# Patient Record
Sex: FEMALE | Race: BLACK OR AFRICAN AMERICAN | Employment: FULL TIME | URBAN - METROPOLITAN AREA
[De-identification: names, ages, dates, MRNs, and addresses within clinical notes are randomized per-mention and may not be internally consistent; named-entity substitution may affect disease eponyms.]

---

## 2017-05-18 ENCOUNTER — HOSPITAL ENCOUNTER (OUTPATIENT)
Dept: MAMMOGRAPHY | Age: 47
Discharge: HOME | End: 2017-05-18
Attending: FAMILY MEDICINE
Payer: COMMERCIAL

## 2017-05-18 DIAGNOSIS — Z12.31 ENCOUNTER FOR MAMMOGRAM TO ESTABLISH BASELINE MAMMOGRAM: ICD-10-CM

## 2017-05-18 PROCEDURE — 77067 SCR MAMMO BI INCL CAD: CPT

## 2018-04-27 ENCOUNTER — OFFICE VISIT (OUTPATIENT)
Dept: NEUROLOGY | Age: 48
End: 2018-04-27

## 2018-04-27 ENCOUNTER — TELEPHONE (OUTPATIENT)
Dept: NEUROLOGY | Age: 48
End: 2018-04-27

## 2018-04-27 VITALS
RESPIRATION RATE: 16 BRPM | DIASTOLIC BLOOD PRESSURE: 70 MMHG | SYSTOLIC BLOOD PRESSURE: 128 MMHG | WEIGHT: 193 LBS | OXYGEN SATURATION: 99 % | HEART RATE: 75 BPM

## 2018-04-27 DIAGNOSIS — R43.1 ABNORMAL SMELL: Primary | ICD-10-CM

## 2018-04-27 DIAGNOSIS — G44.221 CHRONIC TENSION-TYPE HEADACHE, INTRACTABLE: ICD-10-CM

## 2018-04-27 DIAGNOSIS — M54.81 BILATERAL OCCIPITAL NEURALGIA: ICD-10-CM

## 2018-04-27 DIAGNOSIS — R20.2 PARESTHESIA: ICD-10-CM

## 2018-04-27 RX ORDER — OMEPRAZOLE 40 MG/1
40 CAPSULE, DELAYED RELEASE ORAL DAILY
COMMUNITY

## 2018-04-27 RX ORDER — HYDROCHLOROTHIAZIDE 12.5 MG/1
12.5 TABLET ORAL DAILY
COMMUNITY

## 2018-04-27 RX ORDER — LEVONORGESTREL AND ETHINYL ESTRADIOL 6-5-10
KIT ORAL
COMMUNITY

## 2018-04-27 RX ORDER — LISINOPRIL 40 MG/1
40 TABLET ORAL DAILY
COMMUNITY

## 2018-04-27 NOTE — PROGRESS NOTES
NEUROLOGY NEW PATIENT CONSULTATION    REFERRED BY:  Christo Carrasco MD    CHIEF COMPLAINT:  Migraines    HISTORY OF PRESENT ILLNESS    HISTORY PROVIDED BY:  Patient      Comfort Enrique is a 52 y.o. female who I am asked to see in consultation for migraines. Patient reports a history of migraines as a teenager. She has been having issues for about 12 years. She has had issues with headaches and smelling an odor. She has had normal CT sinuses checked. She has had an MRI brain and MRA head/neck that were fine. Nov 2017. She also had an EEG that was fine. She has been experiencing headaches with abnormal sensations on the left side more recently. The paresthesias are on the left side of her scalp and sometimes in her face. She gets headaches in the temples and forehead. She also started with them in the back of her head. She is getting these headaches every day. No nausea, no photo/phonophobia. She will get occasional dizziness. She feels like her headaches are about the same as her migraines. She cannot differentiate them. She does not have any nausea or photo/phonophobia with her migraines either. She has a left eye twitch that is also recent. She does drink a lot coke. She has issues with an abnormal smell. It is not all day long. She will have a rotten egg smell. It will be a few minutes to all day. No seizures that she is aware. No epilepsy risk factors. She says that at some point she was put on a medication but doesn't recall what it was- maybe topamax. She did not like having a feels that she stopped taking it. She did not follow-up with his neurologist after that. FH of sister and mom with headaches. No seizures. Patient denies any new focal numbness or weakness. She has had MRI of the brain while having the above symptoms. She is under significant stress given that her  was recently diagnosed with stage IV kidney cancer with metastases to the brain.   He just had gamma knife with Dr. yCrus Martinez. PMH  Hypertension, migraines    SH  Social History     Social History    Marital status:      Spouse name: N/A    Number of children: N/A    Years of education: N/A     Social History Main Topics    Smoking status: Not on file    Smokeless tobacco: Not on file    Alcohol use Not on file    Drug use: Not on file    Sexual activity: Not on file     Other Topics Concern    Not on file     Social History Narrative    No narrative on file       FH  Headaches in sister and mom    ALLERGIES  Allergies no known allergies    CURRENT MEDS    Current Outpatient Prescriptions:     lisinopril (PRINIVIL, ZESTRIL) 40 mg tablet, Take 40 mg by mouth daily. , Disp: , Rfl:     hydroCHLOROthiazide (HYDRODIURIL) 12.5 mg tablet, Take 12.5 mg by mouth daily. , Disp: , Rfl:     levonorgestrel-ethinyl estradiol (ENPRESSE) 50-30 (6)/75-40 (5)/125-30(10) tab, Take  by mouth., Disp: , Rfl:     omeprazole (PRILOSEC) 40 mg capsule, Take 40 mg by mouth daily. , Disp: , Rfl:   Flonase and Dymsta    REVIEW OF SYSTEMS:     Y  N       Y  N  Y  N   Y  N  [] [x] AIDS          [] [x] Falls  [] [x] Memory Loss  [] [x]  Shortness of breath  [] [x] Anxiety          [] [x] Fatigue [] [x] Muscle Pain        [] [x]  Skipped beats  [] [x] Chest Pain   [x] [] Frequent HA [] [x] Ms Weakness     [] [x]  Snoring  [] [x] Constipation [] [x]Hearing loss [] [x] Nause/Vomiting  [] [x]  Stomach Pain  [] [x] Cough          [] [x]Hepatitis [] [x] Neuropathy         [] [x]  Swallowing difficulty  [] [x] Depression  [] [x]Incontinence [] [x] Poor appetite      [] [x]  Vertigo  [] [x] Diarrhea       [] [x] Joint Pain [] [x] Rash                   [] [x]  Visual disturbances  [] [x] Fainting        [] [x] Leg Swelling [] [x] Ringing ears       [] [x]  Weight changes      []Unable to obtain  ROS due to  []mental status change  []sedated   []intubated          PREVIOUS WORKUP  IMAGING: MRI of the brain normal per patient.   Will get records    LABS  No results found for this or any previous visit. PHYSICAL EXAM  Visit Vitals    Wt 87.5 kg (193 lb)     General:  Alert, cooperative, no distress. Head:  Normocephalic, without obvious abnormality, atraumatic. Eyes:  Conjunctivae/corneas clear. Pupils equal, round, reactive to light. Extraocular movements intact, VFF, NO papilledema   Lungs:  Heart:   Non labored breathing  Regular rate and rhythm, no carotid bruits   Abdomen:   Soft, non-distended   Extremities: Extremities normal, atraumatic, no cyanosis or edema. Pulses: 2+ and symmetric all extremities. Skin: Skin color, texture, turgor normal. No rashes or lesions. Neurologic:  Gen: Attention normal             Language: naming, repetition, fluency normal             Memory: intact recent and remote memory  Cranial Nerves:  I: smell Not tested   II: visual fields Full to confrontation   II: pupils Equal, round, reactive to light   II: optic disc No papilledema   III,VII: ptosis none   III,IV,VI: extraocular muscles  Full ROM   V: mastication normal   V: facial light touch sensation  normal   VII: facial muscle function   symmetric   VIII: hearing symmetric   IX: soft palate elevation  normal   XI: trapezius strength  5/5   XI: sternocleidomastoid strength 5/5   XI: neck flexion strength  5/5   XII: tongue  midline     Motor: normal bulk and tone, no tremor              Strength: 5/5 all four extremities  Sensory: intact to LT, PP, vibration, and temperature  Coordination: FTN intact, Rhomberg negative  Gait: normal gait including tandem   Reflexes: 2+ throughout       IMPRESSION  Jonathan Mark is a 52 y.o. female who presents for evaluation of headaches, migraines, paresthesias on the left side of the head and face, and abnormal smells. Patient reports a prior MRI that was negative. Will get this report. She is also had an EEG. She did not have any events during the EEG.   Will do a sleep deprived EEG to see if we can capture an episode. Additionally I think patient is having form a tension headache rather than a migraine headache. Will try to do a preventative medication for this to see if it helps with her symptoms. RECOMMENDATIONS    Problem List Items Addressed This Visit     None      Visit Diagnoses     Abnormal smell    -  Primary    Relevant Medications    lamoTRIgine (LAMICTAL XR STARTER, ORANGE,) 25mg (14)-50 mg (14)-100mg (7) TERD    Other Relevant Orders    CBC WITH AUTOMATED DIFF    METABOLIC PANEL, COMPREHENSIVE    EEG AMB NEURO    Chronic tension-type headache, intractable        Relevant Medications    lamoTRIgine (LAMICTAL XR STARTER, ORANGE,) 25mg (14)-50 mg (14)-100mg (7) TERD    Other Relevant Orders    CBC WITH AUTOMATED DIFF    METABOLIC PANEL, COMPREHENSIVE    EEG AMB NEURO    Bilateral occipital neuralgia        Relevant Medications    lamoTRIgine (LAMICTAL XR STARTER, ORANGE,) 25mg (14)-50 mg (14)-100mg (7) TERD    Other Relevant Orders    CBC WITH AUTOMATED DIFF    METABOLIC PANEL, COMPREHENSIVE    EEG AMB NEURO    Paresthesia        Relevant Medications    lamoTRIgine (LAMICTAL XR STARTER, ORANGE,) 25mg (14)-50 mg (14)-100mg (7) TERD    Other Relevant Orders    CBC WITH AUTOMATED DIFF    METABOLIC PANEL, COMPREHENSIVE    EEG AMB NEURO      Will get records of prior MRI. Migraine book also given    FU 10-12 weeks    Arturo Gould MD    CC: Jono Clemens MD  Fax: [phone]    Medications and side effects discussed with patient in detail. With any new medications prescribed, patient was given instructions on administration and side effects. Written medication information was provided to the patient as well. This note was created using voice recognition software. Despite editing, there may be syntax errors. This note will not be viewable in [address]

## 2018-04-27 NOTE — TELEPHONE ENCOUNTER
Called and spoke with pharmacist to clarify lamictal XR prescription.     Per Dr. Gus Nogueira  Lamictal XR     25 mg tabs quantity 14 tablets take 1 tab by mouth for 2 weeks    50mg tabs quantity 14 tabs take 1 tab by mouth for 2 weeks    100 mg tabs quantity 30 tabs take 1 tab by mouth     P

## 2018-04-27 NOTE — PATIENT INSTRUCTIONS
[facility]   Statement to Patients  April 1, 2014      In an effort to ensure the large volume of patient prescription refills is processed in the most efficient and expeditious manner, we are asking our patients to assist us by calling your Pharmacy for all prescription refills, this will include also your  Mail Order Pharmacy. The pharmacy will contact our office electronically to continue the refill process. Please do not wait until the last minute to call your pharmacy. We need at least 48 hours (2days) to fill prescriptions. We also encourage you to call your pharmacy before going to  your prescription to make sure it is ready. With regard to controlled substance prescription refill requests (narcotic refills) that need to be picked up at our office, we ask your cooperation by providing us with at least 72 hours (3days) notice that you will need a refill. We will not refill narcotic prescription refill requests after 4:00pm on any weekday, Monday through Thursday, or after 2:00pm on Fridays, or on the weekends. We encourage everyone to explore another way of getting your prescription refill request processed using [employer], our patient web portal through our electronic medical record system. [employer] is an efficient and effective way to communicate your medication request directly to the office and  downloadable as an katerin on your smart phone . [employer] also features a review functionality that allows you to view your medication list as well as leave messages for your physician. Are you ready to get connected? If so please review the attatched instructions or speak to any of our staff to get you set up right away! Thank you so much for your cooperation. Should you have any questions please contact our Practice Administrator.     The Physicians and Staff,  [facility] [address]  What is a living will?    A living will is a legal form you use to write down the kind of care you want at the end of your life. It is used by the health professionals who will treat you if you aren't able to decide for yourself. If you put your wishes in writing, your loved ones and others will know what kind of care you want. They won't need to guess. This can ease your mind and be helpful to others. A living will is not the same as an estate or property will. An estate will explains what you want to happen with your money and property after you die. Is a living will a legal document? A living will is a legal document. Each state has its own laws about living corley. If you move to another state, make sure that your living will is legal in the state where you now live. Or you might use a universal form that has been approved by many states. This kind of form can sometimes be completed and stored online. Your electronic copy will then be available wherever you have a connection to the Internet. In most cases, doctors will respect your wishes even if you have a form from a different state. · You don't need an  to complete a living will. But legal advice can be helpful if your state's laws are unclear, your health history is complicated, or your family can't agree on what should be in your living will. · You can change your living will at any time. Some people find that their wishes about end-of-life care change as their health changes. · In addition to making a living will, think about completing a medical power of  form. This form lets you name the person you want to make end-of-life treatment decisions for you (your \"health care agent\") if you're not able to. Many hospitals and nursing homes will give you the forms you need to complete a living will and a medical power of . · Your living will is used only if you can't make or communicate decisions for yourself anymore.  If you become able to make decisions again, you can accept or refuse any treatment, no matter what you wrote in your living will. · Your state may offer an online registry. This is a place where you can store your living will online so the doctors and nurses who need to treat you can find it right away. What should you think about when creating a living will? Talk about your end-of-life wishes with your family members and your doctor. Let them know what you want. That way the people making decisions for you won't be surprised by your choices. Think about these questions as you make your living will:  · Do you know enough about life support methods that might be used? If not, talk to your doctor so you know what might be done if you can't breathe on your own, your heart stops, or you're unable to swallow. · What things would you still want to be able to do after you receive life-support methods? Would you want to be able to walk? To speak? To eat on your own? To live without the help of machines? · If you have a choice, where do you want to be cared for? In your home? At a hospital or nursing home? · Do you want certain Presybeterian practices performed if you become very ill? · If you have a choice at the end of your life, where would you prefer to die? At home? In a hospital or nursing home? Somewhere else? · Would you prefer to be buried or cremated? · Do you want your organs to be donated after you die? What should you do with your living will? · Make sure that your family members and your health care agent have copies of your living will. · Give your doctor a copy of your living will to keep in your medical record. If you have more than one doctor, make sure that each one has a copy. · You may want to put a copy of your living will where it can be easily found. Where can you learn more? Go to http://emiliano-jovan.info/. Enter H869 in the search box to learn more about \"Learning About Living Perjack. \"  Current as of: September 24, 2016  Content Version: 11.4  © 8428-4556 [employer]. Care instructions adapted under license by [employer] (which disclaims liability or warranty for this information). If you have questions about a medical condition or this instruction, always ask your healthcare professional. [address] any warranty or liability for your use of this information. Advance Directives: Care Instructions  Your Care Instructions  An advance directive is a legal way to state your wishes at the end of your life. It tells your family and your doctor what to do if you can no longer say what you want. There are two main types of advance directives. You can change them any time that your wishes change. · A living will tells your family and your doctor your wishes about life support and other treatment. · A durable power of  for health care lets you name a person to make treatment decisions for you when you can't speak for yourself. This person is called a health care agent. If you do not have an advance directive, decisions about your medical care may be made by a doctor or a  who doesn't know you. It may help to think of an advance directive as a gift to the people who care for you. If you have one, they won't have to make tough decisions by themselves. Follow-up care is a key part of your treatment and safety. Be sure to make and go to all appointments, and call your doctor if you are having problems. It's also a good idea to know your test results and keep a list of the medicines you take. How can you care for yourself at home? · Discuss your wishes with your loved ones and your doctor. This way, there are no surprises. · Many states have a unique form. Or you might use a universal form that has been approved by many states. This kind of form can sometimes be completed and stored online.  Your electronic copy will then be available wherever you have a connection to the Internet. In most cases, doctors will respect your wishes even if you have a form from a different state. · You don't need a  to do an advance directive. But you may want to get legal advice. · Think about these questions when you prepare an advance directive:  ¨ Who do you want to make decisions about your medical care if you are not able to? Many people choose a family member or close friend. ¨ Do you know enough about life support methods that might be used? If not, talk to your doctor so you understand. ¨ What are you most afraid of that might happen? You might be afraid of having pain, losing your independence, or being kept alive by machines. ¨ Where would you prefer to die? Choices include your home, a hospital, or a nursing home. ¨ Would you like to have information about hospice care to support you and your family? ¨ Do you want to donate organs when you die? ¨ Do you want certain Sabianist practices performed before you die? If so, put your wishes in the advance directive. · Read your advance directive every year, and make changes as needed. When should you call for help? Be sure to contact your doctor if you have any questions. Where can you learn more? Go to http://emiliano-jovan.info/. Enter R264 in the search box to learn more about \"Advance Directives: Care Instructions. \"  Current as of: September 24, 2016  Content Version: 11.4  © 9883-9576 [employer]. Care instructions adapted under license by [employer] (which disclaims liability or warranty for this information). If you have questions about a medical condition or this instruction, always ask your healthcare professional. [address] any warranty or liability for your use of this information.

## 2018-04-27 NOTE — TELEPHONE ENCOUNTER
[facility] pharmacy calling requesting clarification for lamictal. Can be reached at [phone]

## 2018-04-27 NOTE — PROGRESS NOTES
Chief Complaint   Patient presents with    Headache     olfactory      1. Have you been to the ER, urgent care clinic since your last visit? Hospitalized since your last visit? No    2. Have you seen or consulted any other health care providers outside of the [address] [place] [address] since your last visit? Include any pap smears or colon screening.  No     Reviewed record in preparation for visit and have necessary documentation  Pt did not bring medication to office visit for review  Medication list reviewed and reconciled with patient  Information was given to pt on Advanced Directives, Living Will  opportunity was given for questions

## 2018-04-27 NOTE — MR AVS SNAPSHOT
[facility] 
 
 
 [place] [address] [address] [phone] Patient: Danny Manuel MRN: [id number]:[id number] Visit Information Date & Time Provider Department Dept. Phone Encounter #  
 4/27/2018  2:00 PM Farooq Orosco MD [facility] Neurology [facility] [phone] [id number] Follow-up Instructions Return in about 10 weeks (around 7/6/2018). Your Appointments 5/14/2018  8:30 AM  
PROCEDURE with EEG [facility]) Appt Note: Sleep deprived EEG  
 [address] [address] [phone]  
  
   
 [place] [id number] [address] 7/13/2018  2:40 PM  
Follow Up with Farooq Orosco MD  
[facility] Appt Note: Test results [place] [address] [address] [phone]  
  
   
 [place] [id number] [address] Upcoming Health Maintenance Date Due DTaP/Tdap/Td series (1 - Tdap) 11/13/1991 PAP AKA CERVICAL CYTOLOGY 11/13/1991 Influenza Age 5 to Adult 8/1/2018 Allergies as of 4/27/2018  Never Reviewed No Known Allergies Current Immunizations  Never Reviewed No immunizations on file. Not reviewed this visit You Were Diagnosed With   
  
 Codes Comments Abnormal smell    -  Primary ICD-10-CM: R43.1 ICD-9-CM: 620. 1 Chronic tension-type headache, intractable     ICD-10-CM: Y75.926 ICD-9-CM: 339.12 Bilateral occipital neuralgia     ICD-10-CM: M54.81 ICD-9-CM: 723.8 Paresthesia     ICD-10-CM: R20.2 ICD-9-CM: 778. 0 Vitals Weight(growth percentile) OB Status 193 lb (87.5 kg) Having regular periods Preferred Pharmacy Pharmacy Name Phone [facility] [address] [place] [address] [phone] Your Updated Medication List  
  
   
This list is accurate as of 4/27/18  3:05 PM.  Always use your most recent med list.  
  
  
  
  
 hydroCHLOROthiazide 12.5 mg tablet Commonly known as:  HYDRODIURIL Take 12.5 mg by mouth daily. lamoTRIgine 25mg (14)-50 mg (14)-100mg (7) Terd Commonly known as: LaMICtal XR Starter (Orange) Take as directed  
  
 levonorgestrel-ethinyl estradiol 50-30 (6)/75-40 (5)/125-30(10) Tab Commonly known as:  ENPRESSE Take  by mouth.  
  
 lisinopril 40 mg tablet Commonly known as:  Velton Cardona Take 40 mg by mouth daily. PriLOSEC 40 mg capsule Generic drug:  omeprazole Take 40 mg by mouth daily. Prescriptions Sent to Pharmacy Refills  
 lamoTRIgine (LAMICTAL XR STARTER, ORANGE,) 25mg (14)-50 mg (14)-100mg (7) TERD 35 Sig: Take as directed Class: Normal  
 Pharmacy: [facility] [address] [place] [address] Ph #: [phone] We Performed the Following CBC WITH AUTOMATED DIFF [16083 CPT(R)] METABOLIC PANEL, COMPREHENSIVE [87734 CPT(R)] Follow-up Instructions Return in about 10 weeks (around 7/6/2018). To-Do List   
 04/28/2018 Neurology:  EEG AMB NEURO Patient Instructions PRESCRIPTION REFILL POLICY [facility] Statement to Patients April 1, 2014 In an effort to ensure the large volume of patient prescription refills is processed in the most efficient and expeditious manner, we are asking our patients to assist us by calling your Pharmacy for all prescription refills, this will include also your  Mail Order Pharmacy. The pharmacy will contact our office electronically to continue the refill process. Please do not wait until the last minute to call your pharmacy. We need at least 48 hours (2days) to fill prescriptions.  We also encourage you to call your pharmacy before going to  your prescription to make sure it is ready. With regard to controlled substance prescription refill requests (narcotic refills) that need to be picked up at our office, we ask your cooperation by providing us with at least 72 hours (3days) notice that you will need a refill. We will not refill narcotic prescription refill requests after 4:00pm on any weekday, Monday through Thursday, or after 2:00pm on Fridays, or on the weekends. We encourage everyone to explore another way of getting your prescription refill request processed using [employer], our patient web portal through our electronic medical record system. [employer] is an efficient and effective way to communicate your medication request directly to the office and  downloadable as an katerin on your smart phone . [employer] also features a review functionality that allows you to view your medication list as well as leave messages for your physician. Are you ready to get connected? If so please review the attatched instructions or speak to any of our staff to get you set up right away! Thank you so much for your cooperation. Should you have any questions please contact our Practice Administrator. The Physicians and Staff,  [facility] [address] What is a living will? A living will is a legal form you use to write down the kind of care you want at the end of your life. It is used by the health professionals who will treat you if you aren't able to decide for yourself. If you put your wishes in writing, your loved ones and others will know what kind of care you want. They won't need to guess. This can ease your mind and be helpful to others. A living will is not the same as an estate or property will. An estate will explains what you want to happen with your money and property after you die. Is a living will a legal document? A living will is a legal document. Each state has its own laws about living corley. If you move to another state, make sure that your living will is legal in the state where you now live. Or you might use a universal form that has been approved by many states. This kind of form can sometimes be completed and stored online. Your electronic copy will then be available wherever you have a connection to the Internet. In most cases, doctors will respect your wishes even if you have a form from a different state. · You don't need an  to complete a living will. But legal advice can be helpful if your state's laws are unclear, your health history is complicated, or your family can't agree on what should be in your living will. · You can change your living will at any time. Some people find that their wishes about end-of-life care change as their health changes. · In addition to making a living will, think about completing a medical power of  form. This form lets you name the person you want to make end-of-life treatment decisions for you (your \"health care agent\") if you're not able to. Many hospitals and nursing homes will give you the forms you need to complete a living will and a medical power of . · Your living will is used only if you can't make or communicate decisions for yourself anymore. If you become able to make decisions again, you can accept or refuse any treatment, no matter what you wrote in your living will. · Your state may offer an online registry. This is a place where you can store your living will online so the doctors and nurses who need to treat you can find it right away. What should you think about when creating a living will? Talk about your end-of-life wishes with your family members and your doctor. Let them know what you want. That way the people making decisions for you won't be surprised by your choices. Think about these questions as you make your living will: · Do you know enough about life support methods that might be used? If not, talk to your doctor so you know what might be done if you can't breathe on your own, your heart stops, or you're unable to swallow. · What things would you still want to be able to do after you receive life-support methods? Would you want to be able to walk? To speak? To eat on your own? To live without the help of machines? · If you have a choice, where do you want to be cared for? In your home? At a hospital or nursing home? · Do you want certain Roman Catholic practices performed if you become very ill? · If you have a choice at the end of your life, where would you prefer to die? At home? In a hospital or nursing home? Somewhere else? · Would you prefer to be buried or cremated? · Do you want your organs to be donated after you die? What should you do with your living will? · Make sure that your family members and your health care agent have copies of your living will. · Give your doctor a copy of your living will to keep in your medical record. If you have more than one doctor, make sure that each one has a copy. · You may want to put a copy of your living will where it can be easily found. Where can you learn more? Go to http://emiliano-jovan.info/. Enter Q430 in the search box to learn more about \"Learning About Living Miah Stubbs. \" Current as of: September 24, 2016 Content Version: 11.4 © 5071-1216 InStaff. Care instructions adapted under license by [employer] (which disclaims liability or warranty for this information). If you have questions about a medical condition or this instruction, always ask your healthcare professional. [place] [address] any warranty or liability for your use of this information. Advance Directives: Care Instructions Your Care Instructions An advance directive is a legal way to state your wishes at the end of your life. It tells your family and your doctor what to do if you can no longer say what you want. There are two main types of advance directives. You can change them any time that your wishes change. · A living will tells your family and your doctor your wishes about life support and other treatment. · A durable power of  for health care lets you name a person to make treatment decisions for you when you can't speak for yourself. This person is called a health care agent. If you do not have an advance directive, decisions about your medical care may be made by a doctor or a  who doesn't know you. It may help to think of an advance directive as a gift to the people who care for you. If you have one, they won't have to make tough decisions by themselves. Follow-up care is a key part of your treatment and safety. Be sure to make and go to all appointments, and call your doctor if you are having problems. It's also a good idea to know your test results and keep a list of the medicines you take. How can you care for yourself at home? · Discuss your wishes with your loved ones and your doctor. This way, there are no surprises. · Many states have a unique form. Or you might use a universal form that has been approved by many states. This kind of form can sometimes be completed and stored online. Your electronic copy will then be available wherever you have a connection to the Internet. In most cases, doctors will respect your wishes even if you have a form from a different state. · You don't need a  to do an advance directive. But you may want to get legal advice. · Think about these questions when you prepare an advance directive: ¨ Who do you want to make decisions about your medical care if you are not able to? Many people choose a family member or close friend. ¨ Do you know enough about life support methods that might be used? If not, talk to your doctor so you understand. ¨ What are you most afraid of that might happen? You might be afraid of having pain, losing your independence, or being kept alive by machines. ¨ Where would you prefer to die? Choices include your home, a hospital, or a nursing home. ¨ Would you like to have information about hospice care to support you and your family? ¨ Do you want to donate organs when you die? ¨ Do you want certain Congregation practices performed before you die? If so, put your wishes in the advance directive. · Read your advance directive every year, and make changes as needed. When should you call for help? Be sure to contact your doctor if you have any questions. Where can you learn more? Go to http://emiliano-jovan.info/. Enter R264 in the search box to learn more about \"Advance Directives: Care Instructions. \" Current as of: September 24, 2016 Content Version: 11.4 © 9965-4371 ASSET4. Care instructions adapted under license by [employer] (which disclaims liability or warranty for this information). If you have questions about a medical condition or this instruction, always ask your healthcare professional. [place] [address] any warranty or liability for your use of this information. Introducing [facility]! Dear Henry Shah: 
Thank you for requesting a Matrimony.com account. Our records indicate that you already have an active [email or web address] account. You can access your account anytime at [email or web address] Did you know that you can access your hospital and ER discharge instructions at any time in [email or web address]? You can also review all of your test results from your hospital stay or ER visit. Additional Information If you have questions, please visit the Frequently Asked Questions section of the [email or web address] website at [email or web address] Remember, [email or web address] is NOT to be used for urgent needs. For medical emergencies, dial 911. Now available from your iPhone and Android! Please provide this summary of care documentation to your next provider. Your primary care clinician is listed as Orlin Orta. If you have any questions after today's visit, please call [phone].

## 2018-04-27 NOTE — LETTER
Dear Mardi Hodgkins, MD, Thank you for allowing me to see your patient, Solange Spain for a neurological consultation. Please see my impression and recommendations as outlined in my note. Sincerely, MD [facility] at [address] [place] [address] BY: 
Mardi Hodgkins, MD 
 
CHIEF COMPLAINT: 
Migraines HISTORY OF PRESENT ILLNESS HISTORY PROVIDED BY: 
Patient Solange Spain is a 52 y.o. female who I am asked to see in consultation for migraines. Patient reports a history of migraines as a teenager. She has been having issues for about 12 years. She has had issues with headaches and smelling an odor. She has had normal CT sinuses checked. She has had an MRI brain and MRA head/neck that were fine. Nov 2017. She also had an EEG that was fine. She has been experiencing headaches with abnormal sensations on the left side more recently. The paresthesias are on the left side of her scalp and sometimes in her face. She gets headaches in the temples and forehead. She also started with them in the back of her head. She is getting these headaches every day. No nausea, no photo/phonophobia. She will get occasional dizziness. She feels like her headaches are about the same as her migraines. She cannot differentiate them. She does not have any nausea or photo/phonophobia with her migraines either. She has a left eye twitch that is also recent. She does drink a lot coke. She has issues with an abnormal smell. It is not all day long. She will have a rotten egg smell. It will be a few minutes to all day. No seizures that she is aware. No epilepsy risk factors. She says that at some point she was put on a medication but doesn't recall what it was- maybe topamax. She did not like having a feels that she stopped taking it. She did not follow-up with his neurologist after that. FH of sister and mom with headaches. No seizures. Patient denies any new focal numbness or weakness. She has had MRI of the brain while having the above symptoms. She is under significant stress given that her  was recently diagnosed with stage IV kidney cancer with metastases to the brain. He just had gamma knife with Dr. Pearl Jain. PMH Hypertension, migraines [address] Social History Social History  Marital status:  Spouse name: N/A  
 Number of children: N/A  
 Years of education: N/A Social History Main Topics  Smoking status: Not on file  Smokeless tobacco: Not on file  Alcohol use Not on file  Drug use: Not on file  Sexual activity: Not on file Other Topics Concern  Not on file Social History Narrative  No narrative on file [place] Headaches in sister and mom ALLERGIES Allergies no known allergies CURRENT MEDS Current Outpatient Prescriptions:  
  lisinopril (PRINIVIL, ZESTRIL) 40 mg tablet, Take 40 mg by mouth daily. , Disp: , Rfl:  
  hydroCHLOROthiazide (HYDRODIURIL) 12.5 mg tablet, Take 12.5 mg by mouth daily. , Disp: , Rfl:  
  levonorgestrel-ethinyl estradiol (ENPRESSE) 50-30 (6)/75-40 (5)/125-30(10) tab, Take  by mouth., Disp: , Rfl:  
  omeprazole (PRILOSEC) 40 mg capsule, Take 40 mg by mouth daily. , Disp: , Rfl:  
Flonase and Dymsta REVIEW OF SYSTEMS:  
 
Y  N       Y  N  Y  N   Y  N 
[] [x] AIDS          [] [x] Falls  [] [x] Memory Loss  [] [x]  Shortness of breath 
[] [x] Anxiety          [] [x] Fatigue [] [x] Muscle Pain        [] [x]  Skipped beats 
[] [x] Chest Pain   [x] [] Frequent HA [] [x] Ms Weakness     [] [x]  Snoring 
[] [x] Constipation [] [x]Hearing loss [] [x] Nause/Vomiting  [] [x]  Stomach Pain 
[] [x] Cough          [] [x]Hepatitis [] [x] Neuropathy         [] [x]  Swallowing difficulty 
[] [x] Depression  [] [x]Incontinence [] [x] Poor appetite      [] [x]  Vertigo [] [x] Diarrhea       [] [x] Joint Pain [] [x] Rash                   [] [x]  Visual disturbances 
[] [x] Fainting        [] [x] Leg Swelling [] [x] Ringing ears       [] [x]  Weight changes []Unable to obtain  ROS due to  []mental status change  []sedated   []intubated PREVIOUS WORKUP IMAGING: MRI of the brain normal per patient. Will get records LABS No results found for this or any previous visit. PHYSICAL EXAM 
Visit Vitals  Wt 87.5 kg (193 lb) General:  Alert, cooperative, no distress. Head:  Normocephalic, without obvious abnormality, atraumatic. Eyes:  Conjunctivae/corneas clear. Pupils equal, round, reactive to light. Extraocular movements intact, VFF, NO papilledema Lungs: 
Heart:   Non labored breathing Regular rate and rhythm, no carotid bruits Abdomen:   Soft, non-distended Extremities: Extremities normal, atraumatic, no cyanosis or edema. Pulses: 2+ and symmetric all extremities. Skin: Skin color, texture, turgor normal. No rashes or lesions. Neurologic:  Gen: Attention normal 
           Language: naming, repetition, fluency normal 
           Memory: intact recent and remote memory Cranial Nerves: 
I: smell Not tested II: visual fields Full to confrontation II: pupils Equal, round, reactive to light II: optic disc No papilledema III,VII: ptosis none III,IV,VI: extraocular muscles  Full ROM V: mastication normal  
V: facial light touch sensation  normal  
VII: facial muscle function   symmetric VIII: hearing symmetric IX: soft palate elevation  normal  
XI: trapezius strength  5/5 XI: sternocleidomastoid strength 5/5 XI: neck flexion strength  5/5 XII: tongue  midline Motor: normal bulk and tone, no tremor Strength: 5/5 all four extremities Sensory: intact to LT, PP, vibration, and temperature Coordination: FTN intact, Rhomberg negative Gait: normal gait including tandem Reflexes: 2+ throughout IMPRESSION 
 Sussy Park is a 52 y.o. female who presents for evaluation of headaches, migraines, paresthesias on the left side of the head and face, and abnormal smells. Patient reports a prior MRI that was negative. Will get this report. She is also had an EEG. She did not have any events during the EEG. Will do a sleep deprived EEG to see if we can capture an episode. Additionally I think patient is having form a tension headache rather than a migraine headache. Will try to do a preventative medication for this to see if it helps with her symptoms. RECOMMENDATIONS Problem List Items Addressed This Visit None Visit Diagnoses Abnormal smell    -  Primary Relevant Medications  
 lamoTRIgine (LAMICTAL XR STARTER, ORANGE,) 25mg (14)-50 mg (14)-100mg (7) TERD Other Relevant Orders CBC WITH AUTOMATED DIFF  
 METABOLIC PANEL, COMPREHENSIVE  
 EEG AMB NEURO Chronic tension-type headache, intractable Relevant Medications  
 lamoTRIgine (LAMICTAL XR STARTER, ORANGE,) 25mg (14)-50 mg (14)-100mg (7) TERD Other Relevant Orders CBC WITH AUTOMATED DIFF  
 METABOLIC PANEL, COMPREHENSIVE  
 EEG AMB NEURO Bilateral occipital neuralgia Relevant Medications  
 lamoTRIgine (LAMICTAL XR STARTER, ORANGE,) 25mg (14)-50 mg (14)-100mg (7) TERD Other Relevant Orders CBC WITH AUTOMATED DIFF  
 METABOLIC PANEL, COMPREHENSIVE  
 EEG AMB NEURO Paresthesia Relevant Medications  
 lamoTRIgine (LAMICTAL XR STARTER, ORANGE,) 25mg (14)-50 mg (14)-100mg (7) TERD Other Relevant Orders CBC WITH AUTOMATED DIFF  
 METABOLIC PANEL, COMPREHENSIVE  
 EEG AMB NEURO Will get records of prior MRI. Migraine book also given FU 10-12 weeks Lonnie Weber MD 
 
CC: Mikhail Allen MD 
Fax: [phone] Medications and side effects discussed with patient in detail.   With any new medications prescribed, patient was given instructions on administration and side effects. Written medication information was provided to the patient as well. This note was created using voice recognition software. Despite editing, there may be syntax errors. This note will not be viewable in [address] Chief Complaint Patient presents with  
 Headache  
  olfactory 1. Have you been to the ER, urgent care clinic since your last visit? Hospitalized since your last visit? No 
 
2. Have you seen or consulted any other health care providers outside of the [address] [place] [address] since your last visit? Include any pap smears or colon screening. No  
 
Reviewed record in preparation for visit and have necessary documentation Pt did not bring medication to office visit for review Medication list reviewed and reconciled with patient Information was given to pt on Advanced Directives, Living Will 
opportunity was given for questions

## 2018-04-28 LAB
ALBUMIN SERPL-MCNC: 4.8 G/DL (ref 3.5–5.5)
ALBUMIN/GLOB SERPL: 1.8 {RATIO} (ref 1.2–2.2)
ALP SERPL-CCNC: 97 IU/L (ref 39–117)
ALT SERPL-CCNC: 19 IU/L (ref 0–32)
AST SERPL-CCNC: 16 IU/L (ref 0–40)
BASOPHILS # BLD AUTO: 0 X10E3/UL (ref 0–0.2)
BASOPHILS NFR BLD AUTO: 0 %
BILIRUB SERPL-MCNC: 0.3 MG/DL (ref 0–1.2)
BUN SERPL-MCNC: 13 MG/DL (ref 6–24)
BUN/CREAT SERPL: 15 (ref 9–23)
CALCIUM SERPL-MCNC: 9.7 MG/DL (ref 8.7–10.2)
CHLORIDE SERPL-SCNC: 95 MMOL/L (ref 96–106)
CO2 SERPL-SCNC: 27 MMOL/L (ref 18–29)
CREAT SERPL-MCNC: 0.84 MG/DL (ref 0.57–1)
EOSINOPHIL # BLD AUTO: 0.2 X10E3/UL (ref 0–0.4)
EOSINOPHIL NFR BLD AUTO: 2 %
ERYTHROCYTE [DISTWIDTH] IN BLOOD BY AUTOMATED COUNT: 13.6 % (ref 12.3–15.4)
GFR SERPLBLD CREATININE-BSD FMLA CKD-EPI: 83 ML/MIN/1.73
GFR SERPLBLD CREATININE-BSD FMLA CKD-EPI: 96 ML/MIN/1.73
GLOBULIN SER CALC-MCNC: 2.6 G/DL (ref 1.5–4.5)
GLUCOSE SERPL-MCNC: 93 MG/DL (ref 65–99)
HCT VFR BLD AUTO: 36.2 % (ref 34–46.6)
HGB BLD-MCNC: 12 G/DL (ref 11.1–15.9)
IMM GRANULOCYTES # BLD: 0 X10E3/UL (ref 0–0.1)
IMM GRANULOCYTES NFR BLD: 0 %
LYMPHOCYTES # BLD AUTO: 3.8 X10E3/UL (ref 0.7–3.1)
LYMPHOCYTES NFR BLD AUTO: 42 %
MCH RBC QN AUTO: 29.4 PG (ref 26.6–33)
MCHC RBC AUTO-ENTMCNC: 33.1 G/DL (ref 31.5–35.7)
MCV RBC AUTO: 89 FL (ref 79–97)
MONOCYTES # BLD AUTO: 0.4 X10E3/UL (ref 0.1–0.9)
MONOCYTES NFR BLD AUTO: 5 %
NEUTROPHILS # BLD AUTO: 4.7 X10E3/UL (ref 1.4–7)
NEUTROPHILS NFR BLD AUTO: 51 %
PLATELET # BLD AUTO: 432 X10E3/UL (ref 150–379)
POTASSIUM SERPL-SCNC: 3.6 MMOL/L (ref 3.5–5.2)
PROT SERPL-MCNC: 7.4 G/DL (ref 6–8.5)
RBC # BLD AUTO: 4.08 X10E6/UL (ref 3.77–5.28)
SODIUM SERPL-SCNC: 139 MMOL/L (ref 134–144)
WBC # BLD AUTO: 9.1 X10E3/UL (ref 3.4–10.8)

## 2018-05-02 ENCOUNTER — TELEPHONE (OUTPATIENT)
Dept: NEUROLOGY | Age: 48
End: 2018-05-02

## 2018-05-03 ENCOUNTER — DOCUMENTATION ONLY (OUTPATIENT)
Dept: NEUROLOGY | Age: 48
End: 2018-05-03

## 2018-05-14 ENCOUNTER — OFFICE VISIT (OUTPATIENT)
Dept: NEUROLOGY | Age: 48
End: 2018-05-14

## 2018-05-14 DIAGNOSIS — G44.221 CHRONIC TENSION-TYPE HEADACHE, INTRACTABLE: ICD-10-CM

## 2018-05-14 DIAGNOSIS — M54.81 BILATERAL OCCIPITAL NEURALGIA: ICD-10-CM

## 2018-05-14 DIAGNOSIS — R20.2 PARESTHESIA: ICD-10-CM

## 2018-05-14 DIAGNOSIS — R43.1 ABNORMAL SMELL: ICD-10-CM

## 2018-05-16 NOTE — PROCEDURES
Patient Name: Rick Vallejo  : 1970  Age: 52 y.o. Ordering physician: No ref. provider found  Date of EE2018  EEG procedure number: [id number]  Diagnosis: Abnormal smell  Interpreting physician: Janie Baez MD      ELECTROENCEPHALOGRAM REPORT     PROCEDURE: EEG. CLINICAL INDICATION: The patient is a 52 y.o. female with a history of   possible seizures. EEG to rule out seizures, rule out stroke, rule out   cortical abnormality. EEG CLASSIFICATION: Essentially normal    DESCRIPTION OF THE RECORD:   The background of this recording contains a posteriorly-located occipital alpha rhythm of 12 Hz that attenuates with eye opening. Throughout the recording, there were no clear areas of focal slowing nor spike or spike-and-wave discharges seen. Hyperventilation produced no response. Photic stimulation produced a minimal driving response in the posterior head regions. During the recording the patient did not achieve stage II sleep    INTERPRETATION: This is a normal electroencephalogram showing no clear focal abnormalities or epileptiform activity. A normal EEG doesn't not rule out seizures. Clinical correlation recommended.       Jair Yuen MD  2018  12:58 PM

## 2018-05-17 ENCOUNTER — TELEPHONE (OUTPATIENT)
Dept: NEUROLOGY | Age: 48
End: 2018-05-17

## 2018-05-17 NOTE — TELEPHONE ENCOUNTER
Message left for patient to return call and leave details as to symptoms she is having since stating lamictal.

## 2018-05-17 NOTE — TELEPHONE ENCOUNTER
----- Message from Christiano Tejeda sent at 5/17/2018  8:16 AM EDT -----  Regarding: Dr. Radha Lomax  Pt stated she was prescribed a Rx(\"Lamotroigine\" Tablet starter kit)  2 wks ago, and she has questions regarding the side effects, and requested a call from the nurse. Best contact number ([phone].

## 2018-05-21 ENCOUNTER — TELEPHONE (OUTPATIENT)
Dept: NEUROLOGY | Age: 48
End: 2018-05-21

## 2018-05-21 NOTE — TELEPHONE ENCOUNTER
----- Message from Tabby You sent at 5/21/2018 11:01 AM EDT -----  Regarding: Dr. Franco Carlson   Pt stated that she is not having any issues with medication but has concerns about the side effects the medication can cause. and wanted concerned about the liver problems and blood issues that I can cause. Best contact number is [phone].

## 2018-05-21 NOTE — TELEPHONE ENCOUNTER
That is why we check labs after 4 weeks of being on it.  Please see if she has an appt with us in that time frame or send her a lab slip for a CBC with diff and CMP

## 2018-05-22 NOTE — TELEPHONE ENCOUNTER
----- Message from Jaci Zuniga sent at 5/22/2018  8:55 AM EDT -----  Regarding: Dr. Lin Abreu  Pt returned a call missed yesterday. Best contact [phone].

## 2018-05-23 ENCOUNTER — TELEPHONE (OUTPATIENT)
Dept: NEUROLOGY | Age: 48
End: 2018-05-23

## 2018-05-23 DIAGNOSIS — M54.81 BILATERAL OCCIPITAL NEURALGIA: ICD-10-CM

## 2018-05-23 DIAGNOSIS — T88.7XXA MEDICATION SIDE EFFECT: Primary | ICD-10-CM

## 2018-05-23 DIAGNOSIS — G44.221 CHRONIC TENSION-TYPE HEADACHE, INTRACTABLE: ICD-10-CM

## 2018-05-23 DIAGNOSIS — R20.2 PARESTHESIA: ICD-10-CM

## 2018-05-23 NOTE — TELEPHONE ENCOUNTER
Order placed for labs CMP CBC, per Verbal Order from Dr. Toan Albert on 5/23/2018 due to updated information on labs.

## 2018-05-23 NOTE — TELEPHONE ENCOUNTER
Called and spoke with patient and she was in the building. I printed out lab slip per Dr. Devin Fernandez request. Patient instructed to get labs done 4 weeks after start date of lamictal. Patient stated she would do so in 2 weeks.

## 2018-06-04 ENCOUNTER — HOSPITAL ENCOUNTER (OUTPATIENT)
Dept: MAMMOGRAPHY | Age: 48
Discharge: HOME | End: 2018-06-04
Attending: PHYSICIAN ASSISTANT
Payer: COMMERCIAL

## 2018-06-04 DIAGNOSIS — Z12.39 SCREENING BREAST EXAMINATION: ICD-10-CM

## 2018-06-04 PROCEDURE — 77067 SCR MAMMO BI INCL CAD: CPT

## 2018-06-11 ENCOUNTER — HOSPITAL ENCOUNTER (OUTPATIENT)
Dept: MAMMOGRAPHY | Age: 48
Discharge: HOME | End: 2018-06-11
Attending: PHYSICIAN ASSISTANT
Payer: COMMERCIAL

## 2018-06-11 DIAGNOSIS — R92.8 ABNORMAL MAMMOGRAM: ICD-10-CM

## 2018-06-11 PROCEDURE — 77065 DX MAMMO INCL CAD UNI: CPT

## 2018-07-13 ENCOUNTER — OFFICE VISIT (OUTPATIENT)
Dept: NEUROLOGY | Age: 48
End: 2018-07-13

## 2018-07-13 VITALS
BODY MASS INDEX: 30.7 KG/M2 | DIASTOLIC BLOOD PRESSURE: 82 MMHG | HEART RATE: 98 BPM | SYSTOLIC BLOOD PRESSURE: 132 MMHG | OXYGEN SATURATION: 98 % | RESPIRATION RATE: 16 BRPM | HEIGHT: 66 IN | WEIGHT: 191 LBS

## 2018-07-13 DIAGNOSIS — M54.81 BILATERAL OCCIPITAL NEURALGIA: ICD-10-CM

## 2018-07-13 DIAGNOSIS — Z79.899 HIGH RISK MEDICATIONS (NOT ANTICOAGULANTS) LONG-TERM USE: ICD-10-CM

## 2018-07-13 DIAGNOSIS — G44.221 CHRONIC TENSION-TYPE HEADACHE, INTRACTABLE: Primary | ICD-10-CM

## 2018-07-13 DIAGNOSIS — R43.1 ABNORMAL SMELL: ICD-10-CM

## 2018-07-13 DIAGNOSIS — R20.2 PARESTHESIA: ICD-10-CM

## 2018-07-13 RX ORDER — LAMOTRIGINE 50 MG/1
150 TABLET, EXTENDED RELEASE ORAL DAILY
Qty: 90 TAB | Refills: 5 | Status: SHIPPED | OUTPATIENT
Start: 2018-07-13 | End: 2018-09-07 | Stop reason: SDUPTHER

## 2018-07-13 RX ORDER — ESCITALOPRAM OXALATE 10 MG/1
TABLET ORAL
COMMUNITY
Start: 2018-06-30 | End: 2018-09-07

## 2018-07-13 NOTE — PATIENT INSTRUCTIONS
[facility]   Statement to Patients  April 1, 2014      In an effort to ensure the large volume of patient prescription refills is processed in the most efficient and expeditious manner, we are asking our patients to assist us by calling your Pharmacy for all prescription refills, this will include also your  Mail Order Pharmacy. The pharmacy will contact our office electronically to continue the refill process. Please do not wait until the last minute to call your pharmacy. We need at least 48 hours (2days) to fill prescriptions. We also encourage you to call your pharmacy before going to  your prescription to make sure it is ready. With regard to controlled substance prescription refill requests (narcotic refills) that need to be picked up at our office, we ask your cooperation by providing us with at least 72 hours (3days) notice that you will need a refill. We will not refill narcotic prescription refill requests after 4:00pm on any weekday, Monday through Thursday, or after 2:00pm on Fridays, or on the weekends. We encourage everyone to explore another way of getting your prescription refill request processed using [employer], our patient web portal through our electronic medical record system. LiveU is an efficient and effective way to communicate your medication request directly to the office and  downloadable as an katerin on your smart phone . LiveU also features a review functionality that allows you to view your medication list as well as leave messages for your physician. Are you ready to get connected? If so please review the attatched instructions or speak to any of our staff to get you set up right away! Thank you so much for your cooperation. Should you have any questions please contact our Practice Administrator.     The Physicians and Staff,  [facility]   Patient Instruction Plan/ Result Policy    If we have ordered testing for you, know that; \"NO NEWS IS GOOD NEWS! \" It is our policy that we know longer call patients with results, nor do we  give test results over the phone. We schedule follow up appointments so that your results can be discussed in person. This allows you to address any questions you have regarding the results. If something of concern is revealed on your test, we will contact you to discuss the matter and if needed schedule a sooner follow up appointment. Additionally, results may be found by using the My Chart feature and one of our patient service representatives at the  can give you instructions on how to access this feature to utilize our electronic medical record system. Thank you for your understanding.

## 2018-07-13 NOTE — PROGRESS NOTES
1. Have you been to the ER, urgent care clinic since your last visit? Hospitalized since your last visit? NO    2. Have you seen or consulted any other health care providers outside of the [address] [place] [address] since your last visit? Include any pap smears or colon screening.  No    Chief Complaint   Patient presents with    Head Pain     Visit Vitals    /82    Pulse 98    Resp 16    Ht 5' 6\" (1.676 m)    Wt 86.6 kg (191 lb)    SpO2 98%    BMI 30.83 kg/m2

## 2018-07-13 NOTE — MR AVS SNAPSHOT
[address] [facility] [id number] [phone] Patient: Steven Newman MRN: [id number] YLK:[id number] Visit Information Date & Time Provider Department Dept. Phone Encounter #  
 7/13/2018  2:40 PM Lakshmi Boyce MD [facility] [phone] [id number] Follow-up Instructions Return in about 3 months (around 10/13/2018). Upcoming Health Maintenance Date Due DTaP/Tdap/Td series (1 - Tdap) 11/13/1991 PAP AKA CERVICAL CYTOLOGY 11/13/1991 Influenza Age 5 to Adult 8/1/2018 Allergies as of 7/13/2018  Review Complete On: 5/16/2018 By: Lkashmi Boyce MD  
 No Known Allergies Current Immunizations  Never Reviewed No immunizations on file. Not reviewed this visit You Were Diagnosed With   
  
 Codes Comments Abnormal smell    -  Primary ICD-10-CM: R43.1 ICD-9-CM: 729. 1 Chronic tension-type headache, intractable     ICD-10-CM: Q81.168 ICD-9-CM: 339.12 Paresthesia     ICD-10-CM: R20.2 ICD-9-CM: 782.0 Bilateral occipital neuralgia     ICD-10-CM: M54.81 ICD-9-CM: 723.8 High risk medications (not anticoagulants) long-term use     ICD-10-CM: Z79.899 ICD-9-CM: V58.69 Vitals BP Pulse Resp Height(growth percentile) Weight(growth percentile) SpO2  
 132/82 98 16 5' 6\" (1.676 m) 191 lb (86.6 kg) 98% BMI OB Status 30.83 kg/m2 Having regular periods Vitals History BMI and BSA Data Body Mass Index Body Surface Area  
 30.83 kg/m 2 2.01 m 2 Preferred Pharmacy Pharmacy Name Phone [id number] [place] [address] [phone] Your Updated Medication List  
  
   
This list is accurate as of 7/13/18  3:17 PM.  Always use your most recent med list.  
  
  
  
  
 escitalopram oxalate 10 mg tablet Commonly known as:  Derrell Webster hydroCHLOROthiazide 12.5 mg tablet Commonly known as:  HYDRODIURIL Take 12.5 mg by mouth daily. * lamoTRIgine 25mg (14)-50 mg (14)-100mg (7) Terd Commonly known as: LaMICtal XR Starter (Orange) Take as directed * lamoTRIgine 50 mg Tr24 ER tablet Commonly known as: LaMICtal XR Take 3 Tabs by mouth daily. levonorgestrel-ethinyl estradiol 50-30 (6)/75-40 (5)/125-30(10) Tab Commonly known as:  ENPRESSE Take  by mouth.  
  
 lisinopril 40 mg tablet Commonly known as:  Nonah Sharyn Take 40 mg by mouth daily. PriLOSEC 40 mg capsule Generic drug:  omeprazole Take 40 mg by mouth daily. * Notice: This list has 2 medication(s) that are the same as other medications prescribed for you. Read the directions carefully, and ask your doctor or other care provider to review them with you. Prescriptions Sent to Pharmacy Refills  
 lamoTRIgine (LAMICTAL XR) 50 mg tr24 ER tablet 5 Sig: Take 3 Tabs by mouth daily. Class: Normal  
 Pharmacy: [address] #: [phone] Route: Oral  
  
We Performed the Following CBC WITH AUTOMATED DIFF [02888 CPT(R)] METABOLIC PANEL, COMPREHENSIVE [01644 CPT(R)] Follow-up Instructions Return in about 3 months (around 10/13/2018). To-Do List   
 07/14/2018 Imaging:  CTA HEAD NECK W CONT   
  
 07/14/2018 Neurology:  NEURO EEG 24 HR Patient Instructions PRESCRIPTION REFILL POLICY [facility] Statement to Patients April 1, 2014 In an effort to ensure the large volume of patient prescription refills is processed in the most efficient and expeditious manner, we are asking our patients to assist us by calling your Pharmacy for all prescription refills, this will include also your  Mail Order Pharmacy. The pharmacy will contact our office electronically to continue the refill process. Please do not wait until the last minute to call your pharmacy. We need at least 48 hours (2days) to fill prescriptions. We also encourage you to call your pharmacy before going to  your prescription to make sure it is ready. With regard to controlled substance prescription refill requests (narcotic refills) that need to be picked up at our office, we ask your cooperation by providing us with at least 72 hours (3days) notice that you will need a refill. We will not refill narcotic prescription refill requests after 4:00pm on any weekday, Monday through Thursday, or after 2:00pm on Fridays, or on the weekends. We encourage everyone to explore another way of getting your prescription refill request processed using [employer], our patient web portal through our electronic medical record system. [employer] is an efficient and effective way to communicate your medication request directly to the office and  downloadable as an katerin on your smart phone . [employer] also features a review functionality that allows you to view your medication list as well as leave messages for your physician. Are you ready to get connected? If so please review the attatched instructions or speak to any of our staff to get you set up right away! Thank you so much for your cooperation. Should you have any questions please contact our Practice Administrator. The Physicians and Staff,  [facility] Patient Instruction Plan/ Result Policy If we have ordered testing for you, know that; \"NO NEWS IS GOOD NEWS! \" It is our policy that we know longer call patients with results, nor do we  give test results over the phone. We schedule follow up appointments so that your results can be discussed in person. This allows you to address any questions you have regarding the results.   If something of concern is revealed on your test, we will contact you to discuss the matter and if needed schedule a sooner follow up appointment. Additionally, results may be found by using the My Chart feature and one of our patient service representatives at the  can give you instructions on how to access this feature to utilize our electronic medical record system. Thank you for your understanding. Introducing [facility] SERVICES! Dear Lorraine Fajardo: 
Thank you for requesting a [employer] account. Our records indicate that you already have an active [employer] account. You can access your account anytime at [email or web address] Did you know that you can access your hospital and ER discharge instructions at any time in [employer]? You can also review all of your test results from your hospital stay or ER visit. Additional Information If you have questions, please visit the Frequently Asked Questions section of the [employer] website at [email or web address] Remember, [employer] is NOT to be used for urgent needs. For medical emergencies, dial 911. Now available from your iPhone and Android! Please provide this summary of care documentation to your next provider. Your primary care clinician is listed as Javed Arnold. If you have any questions after today's visit, please call [phone].

## 2018-07-13 NOTE — PROGRESS NOTES
Neurology Progress Note    Patient ID:  Silvia Lam  [id number]  62 y.o.  1970    HISTORY PROVIDED BY:  Patient      Chief Complaint:  Subjective:    Ms. Jazmin Bey is here for follow up today of abnormal smells and migraines. She is still having daily issues with this. She had an EEG that was normal.  She did not have any smells during that time. She did the started the lamictal starter pack. She titrated up to to 100 mg daily. She was having a pressure headache on the temples. She will take tylenol as needed. She was having occipital neuralgia but this better. Overall her head pressure has improved with the Lamictal.  She feels like it could still have some improvement. She denies any new focal numbness or weakness. Patient does report she is a family history of aneurysm. She is concerned about this potential.  She has never had any blood vessel imaging completed. Patient reports that her  who was recently diagnosed with metastatic cancer he is doing well currently and she is very grateful for this. She feels like this has helped her stress level. Objective:   ROS:  Per HPI-  Otherwise 12 point ROS was negative    Meds:  Current Outpatient Prescriptions on File Prior to Visit   Medication Sig Dispense Refill    lisinopril (PRINIVIL, ZESTRIL) 40 mg tablet Take 40 mg by mouth daily.  hydroCHLOROthiazide (HYDRODIURIL) 12.5 mg tablet Take 12.5 mg by mouth daily.  levonorgestrel-ethinyl estradiol (ENPRESSE) 50-30 (6)/75-40 (5)/125-30(10) tab Take  by mouth.  omeprazole (PRILOSEC) 40 mg capsule Take 40 mg by mouth daily.  lamoTRIgine (LAMICTAL XR STARTER, ORANGE,) 25mg (14)-50 mg (14)-100mg (7) TERD Take as directed 36 Tab 35     No current facility-administered medications on file prior to visit.         Imaging:  EEG: Normal  MRI brain: Negative  Reviewed records in [employer] and [facility] tab today    Lab Review   Results for orders placed or performed in visit on 04/27/18   CBC WITH AUTOMATED DIFF   Result Value Ref Range    WBC 9.1 3.4 - 10.8 x10E3/uL    RBC 4.08 3.77 - 5.28 x10E6/uL    HGB 12.0 11.1 - 15.9 g/dL    HCT 36.2 34.0 - 46.6 %    MCV 89 79 - 97 fL    MCH 29.4 26.6 - 33.0 pg    MCHC 33.1 31.5 - 35.7 g/dL    RDW 13.6 12.3 - 15.4 %    PLATELET 997 (H) 653 - 379 x10E3/uL    NEUTROPHILS 51 Not Estab. %    Lymphocytes 42 Not Estab. %    MONOCYTES 5 Not Estab. %    EOSINOPHILS 2 Not Estab. %    BASOPHILS 0 Not Estab. %    ABS. NEUTROPHILS 4.7 1.4 - 7.0 x10E3/uL    Abs Lymphocytes 3.8 (H) 0.7 - 3.1 x10E3/uL    ABS. MONOCYTES 0.4 0.1 - 0.9 x10E3/uL    ABS. EOSINOPHILS 0.2 0.0 - 0.4 x10E3/uL    ABS. BASOPHILS 0.0 0.0 - 0.2 x10E3/uL    IMMATURE GRANULOCYTES 0 Not Estab. %    ABS. IMM. GRANS. 0.0 0.0 - 0.1 K82H7/EB   METABOLIC PANEL, COMPREHENSIVE   Result Value Ref Range    Glucose 93 65 - 99 mg/dL    BUN 13 6 - 24 mg/dL    Creatinine 0.84 0.57 - 1.00 mg/dL    GFR est non-AA 83 >59 mL/min/1.73    GFR est AA 96 >59 mL/min/1.73    BUN/Creatinine ratio 15 9 - 23    Sodium 139 134 - 144 mmol/L    Potassium 3.6 3.5 - 5.2 mmol/L    Chloride 95 (L) 96 - 106 mmol/L    CO2 27 18 - 29 mmol/L    Calcium 9.7 8.7 - 10.2 mg/dL    Protein, total 7.4 6.0 - 8.5 g/dL    Albumin 4.8 3.5 - 5.5 g/dL    GLOBULIN, TOTAL 2.6 1.5 - 4.5 g/dL    A-G Ratio 1.8 1.2 - 2.2    Bilirubin, total 0.3 0.0 - 1.2 mg/dL    Alk. phosphatase 97 39 - 117 IU/L    AST (SGOT) 16 0 - 40 IU/L    ALT (SGPT) 19 0 - 32 IU/L       Exam:  Visit Vitals    /82    Pulse 98    Resp 16    Ht 5' 6\" (1.676 m)    Wt 86.6 kg (191 lb)    SpO2 98%    BMI 30.83 kg/m2     Gen:  Well developed  CV: RRR  Lungs: non labored breathing  Abd: non distending  Neuro: A&O x 3, no dysarthria or aphasia  CN II-XII: PERRL, EOMI, face symmetric, tongue/palate midline  Motor: strength 5/5 all four ext  Sensory: intact to LT  Gait: normal    Assessment:   Milagros Pereyra is a 52 y.o. female who presents for follow up of headaches, migraines, paresthesias on the left side of the head and face, and abnormal smells. MRI of the brain per report was negative. She had an EEG that was normal.  She did not have any events during it. She is willing to do a 24-hour EEG to see if we can capture the events as they are still happening daily. Additionally I think patient is having form a tension headache rather than a migraine headache. We will continue with Lamictal and increase to 150 mg daily to see if we get better benefit. I am concerned with patient's family history of aneurysm and her continued olfactory hallucination for possible aneurysms will also check a CTA of the head and neck. Plan:        Visit Diagnoses     Abnormal smell    -  Primary    Relevant Medications    Increase Lamictal XR to 150 mg daily    Other Relevant Orders    NEURO EEG 24 HR     CTA HEAD NECK W CONT    CBC WITH AUTOMATED DIFF    METABOLIC PANEL, COMPREHENSIVE    Chronic tension-type headache, intractable        Relevant Medications    escitalopram oxalate (LEXAPRO) 10 mg tablet    Increase Lamictal XR to 150 mg daily  We will check labs today for monitoring purposes    Other Relevant Orders    CTA HEAD NECK W CONT    Bilateral occipital neuralgia        Relevant Medications    Continue Lamictal XR and increase to 150 mg daily    High risk medications (not anticoagulants) long-term use        Other Relevant Orders    CBC WITH AUTOMATED DIFF    METABOLIC PANEL, COMPREHENSIVE        Follow-up in 3 months or sooner if needed    Signed:  Lolis Palencia MD  7/13/2018    Medications and side effects discussed with patient in detail. With any new medications prescribed, patient was given instructions on administration and side effects. Written medication information was provided to the patient as well. This note was created using voice recognition software. Despite editing, there may be syntax errors. This note will not be viewable in [address]

## 2018-08-11 LAB
ABSOLUTE BANDS, 67058: NORMAL
ABSOLUTE BLASTS: NORMAL
ABSOLUTE METAMYELOCYTES, 900360: NORMAL
ABSOLUTE MYELOCYTES: NORMAL
ABSOLUTE NRBC,ANRBC: NORMAL
ABSOLUTE PROMYELOCYTES: NORMAL
ALB/GLOBRATIO, 58C: 1.5 (CALC) (ref 1–2.5)
ALBUMIN SERPL-MCNC: 4.4 G/DL (ref 3.6–5.1)
ALP SERPL-CCNC: 94 U/L (ref 33–115)
ALT SERPL-CCNC: 12 U/L (ref 6–29)
AST SERPL W P-5'-P-CCNC: 13 U/L (ref 10–35)
BANDS,BANDS: NORMAL
BASOPHILS # BLD: 29 CELLS/UL (ref 0–200)
BASOPHILS NFR BLD: 0.4 %
BILIRUB SERPL-MCNC: 0.4 MG/DL (ref 0.2–1.2)
BLASTS,BLAST: NORMAL
BUN SERPL-MCNC: 19 MG/DL (ref 7–25)
BUN/CREATININE RATIO,BUCR: ABNORMAL (CALC) (ref 6–22)
CALCIUM SERPL-MCNC: 9.8 MG/DL (ref 8.6–10.2)
CHLORIDE SERPL-SCNC: 105 MMOL/L (ref 98–110)
CO2 SERPL-SCNC: 28 MMOL/L (ref 20–32)
COMMENT(S): NORMAL
CREAT SERPL-MCNC: 0.94 MG/DL (ref 0.5–1.1)
EOSINOPHIL # BLD: 153 CELLS/UL (ref 15–500)
EOSINOPHIL NFR BLD: 2.1 %
ERYTHROCYTE [DISTWIDTH] IN BLOOD BY AUTOMATED COUNT: 11.7 % (ref 11–15)
GLOBULIN,GLOB: 2.9 G/DL (CALC) (ref 1.9–3.7)
GLUCOSE SERPL-MCNC: 109 MG/DL (ref 65–99)
HCT VFR BLD AUTO: 35.5 % (ref 35–45)
HGB BLD-MCNC: 12 G/DL (ref 11.7–15.5)
LYMPHOCYTES # BLD: 2154 CELLS/UL (ref 850–3900)
LYMPHOCYTES NFR BLD: 29.5 %
MCH RBC QN AUTO: 30.3 PG (ref 27–33)
MCHC RBC AUTO-ENTMCNC: 33.8 G/DL (ref 32–36)
MCV RBC AUTO: 89.6 FL (ref 80–100)
METAMYELOCYTES,METAS: NORMAL
MONOCYTES # BLD: 482 CELLS/UL (ref 200–950)
MONOCYTES NFR BLD: 6.6 %
MYELOCYTES,MYELO: NORMAL
NEUTROPHILS # BLD AUTO: 4482 CELLS/UL (ref 1500–7800)
NEUTROPHILS # BLD: 61.4 %
NRBC: NORMAL
PLATELET # BLD AUTO: 359 THOUSAND/UL (ref 140–400)
PMV BLD AUTO: 10.2 FL (ref 7.5–12.5)
POTASSIUM SERPL-SCNC: 5.3 MMOL/L (ref 3.5–5.3)
PROMYELOCYTES,PRO: NORMAL
PROT SERPL-MCNC: 7.3 G/DL (ref 6.1–8.1)
RBC # BLD AUTO: 3.96 MILLION/UL (ref 3.8–5.1)
REACTIVE LYMPHS: NORMAL
SODIUM SERPL-SCNC: 140 MMOL/L (ref 135–146)
WBC # BLD AUTO: 7.3 THOUSAND/UL (ref 3.8–10.8)

## 2018-08-14 ENCOUNTER — TELEPHONE (OUTPATIENT)
Dept: NEUROLOGY | Age: 48
End: 2018-08-14

## 2018-08-14 NOTE — TELEPHONE ENCOUNTER
----- Message from Brittani Hagen sent at 8/14/2018 10:45 AM EDT -----  Regarding: Dr Umesh Ferro with [facility] is calling to request that the order for the MRI of the neck head order be sent to [address] [place] [address] ) [phone] so that they can confirm with the pt whether or not they will be able to perform the testing. Karla Menjivar can be reached at [phone].

## 2018-08-23 ENCOUNTER — HOSPITAL ENCOUNTER (OUTPATIENT)
Dept: CT IMAGING | Age: 48
Discharge: HOME | End: 2018-08-23
Attending: PSYCHIATRY & NEUROLOGY
Payer: COMMERCIAL

## 2018-08-23 ENCOUNTER — HOSPITAL ENCOUNTER (OUTPATIENT)
Dept: NEUROLOGY | Age: 48
Discharge: HOME | End: 2018-08-23
Attending: PSYCHIATRY & NEUROLOGY
Payer: COMMERCIAL

## 2018-08-23 DIAGNOSIS — Z79.899 HIGH RISK MEDICATIONS (NOT ANTICOAGULANTS) LONG-TERM USE: ICD-10-CM

## 2018-08-23 DIAGNOSIS — R43.1 ABNORMAL SMELL: ICD-10-CM

## 2018-08-23 DIAGNOSIS — R20.2 PARESTHESIA: ICD-10-CM

## 2018-08-23 DIAGNOSIS — G44.221 CHRONIC TENSION-TYPE HEADACHE, INTRACTABLE: ICD-10-CM

## 2018-08-23 DIAGNOSIS — M54.81 BILATERAL OCCIPITAL NEURALGIA: ICD-10-CM

## 2018-08-23 PROCEDURE — 70498 CT ANGIOGRAPHY NECK: CPT

## 2018-08-23 PROCEDURE — 95953 NEURO EEG 24 HR: CPT

## 2018-08-23 PROCEDURE — 74011636320 HC RX REV CODE- 636/320

## 2018-08-23 RX ADMIN — IOPAMIDOL 100 ML: 755 INJECTION, SOLUTION INTRAVENOUS at 14:00

## 2018-09-07 ENCOUNTER — OFFICE VISIT (OUTPATIENT)
Dept: NEUROLOGY | Age: 48
End: 2018-09-07

## 2018-09-07 VITALS
SYSTOLIC BLOOD PRESSURE: 128 MMHG | HEIGHT: 66 IN | RESPIRATION RATE: 18 BRPM | WEIGHT: 195 LBS | DIASTOLIC BLOOD PRESSURE: 76 MMHG | HEART RATE: 67 BPM | OXYGEN SATURATION: 99 % | BODY MASS INDEX: 31.34 KG/M2

## 2018-09-07 DIAGNOSIS — G44.221 CHRONIC TENSION-TYPE HEADACHE, INTRACTABLE: Primary | ICD-10-CM

## 2018-09-07 DIAGNOSIS — M54.81 BILATERAL OCCIPITAL NEURALGIA: ICD-10-CM

## 2018-09-07 DIAGNOSIS — R20.2 PARESTHESIA: ICD-10-CM

## 2018-09-07 DIAGNOSIS — R43.1 ABNORMAL SMELL: ICD-10-CM

## 2018-09-07 DIAGNOSIS — Z79.899 HIGH RISK MEDICATIONS (NOT ANTICOAGULANTS) LONG-TERM USE: ICD-10-CM

## 2018-09-07 RX ORDER — LAMOTRIGINE 50 MG/1
150 TABLET, EXTENDED RELEASE ORAL DAILY
Qty: 270 TAB | Refills: 3 | Status: SHIPPED | OUTPATIENT
Start: 2018-09-07

## 2018-09-07 NOTE — PROGRESS NOTES
Chief Complaint   Patient presents with    Head Pain     1. Have you been to the ER, urgent care clinic since your last visit? Hospitalized since your last visit? No    2. Have you seen or consulted any other health care providers outside of the [facility] since your last visit? Include any pap smears or colon screening.  No   Visit Vitals    /76    Pulse 67    Resp 18    Ht 5' 6\" (1.676 m)    Wt 88.5 kg (195 lb)    SpO2 99%    BMI 31.47 kg/m2

## 2018-09-07 NOTE — PROGRESS NOTES
Neurology Progress Note    Patient ID:  Sherryle Evangelist  [id number]  12 y.o.  1970    HISTORY PROVIDED BY:  Patient      Chief Complaint:  Subjective:      Patient presents today for follow up of headaches, paresthesias on the left side of her head and face, and abnormal smells. Since her last visit on 7/13/18, she has been taking Lamictal  mg daily  which she states has helped her headaches and paresthesias, but not the smells. Her headaches have decreased to 2-3 per week, they usually last 2-4 hours, and intensity is 5/10. Her headaches are an aching quality, start in both temporal areas, and sometimes spread to her forehead. She denies any pain in the back of her head. She denies any nausea, vomiting, photophobia, phonophobia, or vision changes. She takes tylenol for her headaches. Since her last visit, she has only had 1-2 paresthesias, lasting a few seconds each, that feel like a cool water sensation on the left side of her face. She denies any numbness, tingling or weakness. She denies any twitching of her face or eyes. She denies any side effects from the Lamictal.     She states she started noticing an unpleasant smell about 10 years ago that smells like sinus drainage or feces. She notices this smell the most in the am when she wakes up and off and on during the day. She denies any constipation and she states her GERD is well controlled. She had her tonsils removed about 10 years ago due to she has tonsilloliths which she thought  might be the cause of the smell, but this only helped a little for a short while. She states about 5 years ago an ENT physician  told her that he saw a \"small pocket\" in her throat that could trap food, but she had no treatment for this. She states she has not mentioned this pocket in her throat to her current ENT. The patient states her current ENT advised her to use daily flonase and astelin which has significantly helped her nasal and sinus symptoms.  She currently denies any sinus pain or pressure, nasal congestion,  PND or rhinorrhea. She states she occasionally also notices a bad taste in her mouth, but she has had her gingiva and teeth examined and they are normal.     Recap of visit on 7/13/18:    Ms. Delicia Urbina is here for follow up today of abnormal smells and migraines. She is still having daily issues with this. She had an EEG that was normal.  She did not have any smells during that time. She did the started the lamictal starter pack. She titrated up to to 100 mg daily. She was having a pressure headache on the temples. She will take tylenol as needed. She was having occipital neuralgia but this better. Overall her head pressure has improved with the Lamictal.  She feels like it could still have some improvement. She denies any new focal numbness or weakness. Patient does report she is a family history of aneurysm. She is concerned about this potential.  She has never had any blood vessel imaging completed. Patient reports that her  who was recently diagnosed with metastatic cancer he is doing well currently and she is very grateful for this. She feels like this has helped her stress level. Objective:   ROS:  Per HPI-  Otherwise 12 point ROS was negative. Meds:    Current Outpatient Prescriptions on File Prior to Visit   Medication Sig Dispense Refill    lisinopril (PRINIVIL, ZESTRIL) 40 mg tablet Take 40 mg by mouth daily.  hydroCHLOROthiazide (HYDRODIURIL) 12.5 mg tablet Take 12.5 mg by mouth daily.  levonorgestrel-ethinyl estradiol (ENPRESSE) 50-30 (6)/75-40 (5)/125-30(10) tab Take  by mouth.  omeprazole (PRILOSEC) 40 mg capsule Take 40 mg by mouth daily.  escitalopram oxalate (LEXAPRO) 10 mg tablet        No current facility-administered medications on file prior to visit. Imaging:    On 8/23/18 patient had a CTA of Head/Neck and 24 hour EEG. The CTA results were as follows:   No significant cervical carotid arterial stenosis. Patent vertebrobasilar system. No proximal large vessel intracranial vascular occlusive change. Incidental mucous retention cyst in the right maxillary sinus and mild  mucoperiosteal thickening involving the left maxillary sinus. Note:  The patient states she and her ENT already knew about the cyst in her right maxillary sinus. Dr. Rivka Arzola reviewed patient's 24 hour EEG before seeing patient today, and informed patient it was normal.      Lab Review     Results below were reviewed by Dr. Rivka Arzola on 8/13/18 and were reviewed with patient at visit today. Results for orders placed or performed in visit on 64/50/98   METABOLIC PANEL, COMPREHENSIVE   Result Value Ref Range    Glucose 109 (H) 65 - 99 mg/dL    BUN 19 7 - 25 mg/dL    Creatinine 0.94 0.50 - 1.10 mg/dL    GFR est non-AA 72 > OR = 60 mL/min/1.73m2    GFR est AA 84 > OR = 60 mL/min/1.73m2    BUN/Creatinine ratio NOT APPLICABLE 6 - 22 (calc)    Sodium 140 135 - 146 mmol/L    Potassium 5.3 3.5 - 5.3 mmol/L    Chloride 105 98 - 110 mmol/L    CO2 28 20 - 32 mmol/L    Calcium 9.8 8.6 - 10.2 mg/dL    Protein, total 7.3 6.1 - 8.1 g/dL    Albumin 4.4 3.6 - 5.1 g/dL    Globulin 2.9 1.9 - 3.7 g/dL (calc)    ALB/GLOBRATIO 1.5 1.0 - 2.5 (calc)    Bilirubin, total 0.4 0.2 - 1.2 mg/dL    Alk. phosphatase 94 33 - 115 U/L    AST (SGOT) 13 10 - 35 U/L    ALT (SGPT) 12 6 - 29 U/L   CBC WITH AUTOMATED DIFF   Result Value Ref Range    WBC 7.3 3.8 - 10.8 Thousand/uL    RBC 3.96 3.80 - 5.10 Million/uL    HGB 12.0 11.7 - 15.5 g/dL    HCT 35.5 35.0 - 45.0 %    MCV 89.6 80.0 - 100.0 fL    MCH 30.3 27.0 - 33.0 pg    MCHC 33.8 32.0 - 36.0 g/dL    RDW 11.7 11.0 - 15.0 %    PLATELET 130 555 - 277 Thousand/uL    MEAN PLATELET VOLUME 62.0 7.5 - 12.5 fL    ABS. NEUTROPHILS 4482 1500 - 7800 cells/uL    ABSOLUTE BANDS CANCELED     ABSOLUTE METAMYELOCYTES CANCELED     ABSOLUTE MYELOCYTES CANCELED     ABSOLUTE PROMYELOCYTES CANCELED     ABS.  LYMPHOCYTES 2664 850 - 3900 cells/uL    ABS. MONOCYTES 482 200 - 950 cells/uL    ABS. EOSINOPHILS 153 15 - 500 cells/uL    ABS. BASOPHILS 29 0 - 200 cells/uL    ABSOLUTE BLASTS CANCELED     ABSOLUTE NRBC CANCELED     Neutrophils 61.4 %    BAND NEUTROPHILS CANCELED     METAMYELOCYTES CANCELED     MYELOCYTES CANCELED     PROMYELOCYTES CANCELED     LYMPHOCYTES 29.5 %    REACTIVE LYMPHS CANCELED     MONOCYTES 6.6 %    EOSINOPHILS 2.1 %    BASOPHILS 0.4 %    BLASTS CANCELED     NRBC CANCELED     COMMENT(S) CANCELED        Exam:  Visit Vitals    /76    Pulse 67    Resp 18    Ht 5' 6\" (1.676 m)    Wt 88.5 kg (195 lb)    SpO2 99%    BMI 31.47 kg/m2     Gen: Well developed,  in NAD. Patient has a bright affect. CV: RRR, no murmur  Lungs: non labored breathing, chest it CTA   Abd: non distending  Neuro: A&O x 3, no dysarthria or aphasia  CN II-XII: PERRL, EOMI, fundoscopic - no papilledema, facial movements are symmetric, tongue/palate midline, trapezius and sternocleidomastoids are 5/5 bilaterally   Coordination:  Finger to nose accurate, romberg negative   Motor: strength 5/5 to upper and lower extremities bilaterally   Sensory: intact to LT  Reflexes - 2+ to upper and lower extremities bilaterally   Gait: normal and steady, heel, toe and tandem gait intact     Assessment:   Gissell Obrien is a 52 y.o. female who presents for follow up of headaches, paresthesias on the left side of the head and face, and abnormal smells. Her CTA was normal except for minor abnormalities in her maxillary sinuses. Her 24 hour EEG was normal.  She has seen significant improvement in her headaches and paresthesias on Lamictal  mg. She continues to notice the abnormal smell, but this could be coming from the [facility] pocket\" in her throat if food is getting trapped in it. Plan:        1. Advised patient to continue Lamictal  mg daily.    2.  Advised to follow up with her current ENT physician and inform him of what she was told by her previous ENT regarding her having a small pocket in her throat that could trap food and ask if this could be causing the smell she notices. 3. Dr. Nori Hermosillo also dicussed the potential benefit of vitamin B2 and magnesium with patient. Patient stated she already takes a daily MVI and she was advised to see if her MVI has these in it. 4.  Follow-up in 6 months or sooner if needed. Signed:  Madeline Snyder NP  9/7/2018      Discussed the above patient with Javier Hopkins NP and agree with her clinical assessment and plan of care as listed above. I have personally seen and examined this patient, reviewed all pertinent data, and agree with plan.     Rachael Ballesteros MD  9/9/2018  5:18 PM

## 2018-09-07 NOTE — LETTER
Chief Complaint Patient presents with  
 Head Pain 1. Have you been to the ER, urgent care clinic since your last visit? Hospitalized since your last visit? No 
 
2. Have you seen or consulted any other health care providers outside of the [address] [place] [address] since your last visit? Include any pap smears or colon screening. No  
Visit Vitals  /76  Pulse 67  Resp 18  Ht 5' 6\" (1.676 m)  Wt 88.5 kg (195 lb)  SpO2 99%  BMI 31.47 kg/m2 Neurology Progress Note Patient ID: 
Kristofer Portre [id number] 
52 y.o. 
1970 HISTORY PROVIDED BY: 
Patient Chief Complaint: 
Subjective:  
  
Patient presents today for follow up of headaches, paresthesias on the left side of her head and face, and abnormal smells. Since her last visit on 7/13/18, she has been taking Lamictal  mg daily  which she states has helped her headaches and paresthesias, but not the smells. Her headaches have decreased to 2-3 per week, they usually last 2-4 hours, and intensity is 5/10. Her headaches are an aching quality, start in both temporal areas, and sometimes spread to her forehead. She denies any pain in the back of her head. She denies any nausea, vomiting, photophobia, phonophobia, or vision changes. She takes tylenol for her headaches. Since her last visit, she has only had 1-2 paresthesias, lasting a few seconds each, that feel like a cool water sensation on the left side of her face. She denies any numbness, tingling or weakness. She denies any twitching of her face or eyes. She denies any side effects from the Lamictal.  
 
She states she started noticing an unpleasant smell about 10 years ago that smells like sinus drainage or feces. She notices this smell the most in the am when she wakes up and off and on during the day. She denies any constipation and she states her GERD is well controlled.  She had her tonsils removed about 10 years ago due to she has tonsilloliths which she thought  might be the cause of the smell, but this only helped a little for a short while. She states about 5 years ago an ENT physician  told her that he saw a \"small pocket\" in her throat that could trap food, but she had no treatment for this. She states she has not mentioned this pocket in her throat to her current ENT. The patient states her current ENT advised her to use daily flonase and astelin which has significantly helped her nasal and sinus symptoms. She currently denies any sinus pain or pressure, nasal congestion,  PND or rhinorrhea. She states she occasionally also notices a bad taste in her mouth, but she has had her gingiva and teeth examined and they are normal.  
 
Recap of visit on 7/13/18: 
 
Ms. Marnie Piña is here for follow up today of abnormal smells and migraines. She is still having daily issues with this. She had an EEG that was normal.  She did not have any smells during that time. She did the started the lamictal starter pack. She titrated up to to 100 mg daily. She was having a pressure headache on the temples. She will take tylenol as needed. She was having occipital neuralgia but this better. Overall her head pressure has improved with the Lamictal.  She feels like it could still have some improvement. She denies any new focal numbness or weakness. Patient does report she is a family history of aneurysm. She is concerned about this potential.  She has never had any blood vessel imaging completed. Patient reports that her  who was recently diagnosed with metastatic cancer he is doing well currently and she is very grateful for this. She feels like this has helped her stress level. Objective:  
ROS: 
Per HPI- 
Otherwise 12 point ROS was negative. Meds: 
 
Current Outpatient Prescriptions on File Prior to Visit Medication Sig Dispense Refill  lisinopril (PRINIVIL, ZESTRIL) 40 mg tablet Take 40 mg by mouth daily.  hydroCHLOROthiazide (HYDRODIURIL) 12.5 mg tablet Take 12.5 mg by mouth daily.  levonorgestrel-ethinyl estradiol (ENPRESSE) 50-30 (6)/75-40 (5)/125-30(10) tab Take  by mouth.  omeprazole (PRILOSEC) 40 mg capsule Take 40 mg by mouth daily.  escitalopram oxalate (LEXAPRO) 10 mg tablet No current facility-administered medications on file prior to visit. Imaging: 
 
On 8/23/18 patient had a CTA of Head/Neck and 24 hour EEG. The CTA results were as follows: No significant cervical carotid arterial stenosis. Patent vertebrobasilar system. No proximal large vessel intracranial vascular occlusive change. Incidental mucous retention cyst in the right maxillary sinus and mild 
mucoperiosteal thickening involving the left maxillary sinus. Note:  The patient states she and her ENT already knew about the cyst in her right maxillary sinus. Dr. Chad Urbina reviewed patient's 24 hour EEG before seeing patient today, and informed patient it was normal.   
 
Lab Review Results below were reviewed by Dr. Chad Urbina on 8/13/18 and were reviewed with patient at visit today. Results for orders placed or performed in visit on 08/10/18 METABOLIC PANEL, COMPREHENSIVE Result Value Ref Range Glucose 109 (H) 65 - 99 mg/dL BUN 19 7 - 25 mg/dL Creatinine 0.94 0.50 - 1.10 mg/dL GFR est non-AA 72 > OR = 60 mL/min/1.73m2 GFR est AA 84 > OR = 60 mL/min/1.73m2 BUN/Creatinine ratio NOT APPLICABLE 6 - 22 (calc) Sodium 140 135 - 146 mmol/L Potassium 5.3 3.5 - 5.3 mmol/L Chloride 105 98 - 110 mmol/L  
 CO2 28 20 - 32 mmol/L Calcium 9.8 8.6 - 10.2 mg/dL Protein, total 7.3 6.1 - 8.1 g/dL Albumin 4.4 3.6 - 5.1 g/dL Globulin 2.9 1.9 - 3.7 g/dL (calc) ALB/GLOBRATIO 1.5 1.0 - 2.5 (calc) Bilirubin, total 0.4 0.2 - 1.2 mg/dL Alk.  phosphatase 94 33 - 115 U/L  
 AST (SGOT) 13 10 - 35 U/L  
 ALT (SGPT) 12 6 - 29 U/L  
CBC WITH AUTOMATED DIFF Result Value Ref Range WBC 7.3 3.8 - 10.8 Thousand/uL RBC 3.96 3.80 - 5.10 Million/uL HGB 12.0 11.7 - 15.5 g/dL HCT 35.5 35.0 - 45.0 % MCV 89.6 80.0 - 100.0 fL  
 MCH 30.3 27.0 - 33.0 pg  
 MCHC 33.8 32.0 - 36.0 g/dL  
 RDW 11.7 11.0 - 15.0 % PLATELET 629 316 - 042 Thousand/uL MEAN PLATELET VOLUME 71.7 7.5 - 12.5 fL  
 ABS. NEUTROPHILS 4482 1500 - 7800 cells/uL ABSOLUTE BANDS CANCELED ABSOLUTE METAMYELOCYTES CANCELED ABSOLUTE MYELOCYTES CANCELED ABSOLUTE PROMYELOCYTES CANCELED   
 ABS. LYMPHOCYTES 2154 850 - 3900 cells/uL  
 ABS. MONOCYTES 482 200 - 950 cells/uL  
 ABS. EOSINOPHILS 153 15 - 500 cells/uL  
 ABS. BASOPHILS 29 0 - 200 cells/uL ABSOLUTE BLASTS CANCELED ABSOLUTE NRBC CANCELED Neutrophils 61.4 % BAND NEUTROPHILS CANCELED METAMYELOCYTES CANCELED MYELOCYTES CANCELED   
 PROMYELOCYTES CANCELED   
 LYMPHOCYTES 29.5 % REACTIVE LYMPHS CANCELED MONOCYTES 6.6 % EOSINOPHILS 2.1 % BASOPHILS 0.4 % BLASTS CANCELED   
 NRBC CANCELED COMMENT(S) CANCELED Exam: 
Visit Vitals  /76  Pulse 67  Resp 18  Ht 5' 6\" (1.676 m)  Wt 88.5 kg (195 lb)  SpO2 99%  BMI 31.47 kg/m2 Gen: Well developed,  in NAD. Patient has a bright affect. CV: RRR, no murmur Lungs: non labored breathing, chest it CTA Abd: non distending Neuro: A&O x 3, no dysarthria or aphasia CN II-XII: PERRL, EOMI, fundoscopic - no papilledema, facial movements are symmetric, tongue/palate midline, trapezius and sternocleidomastoids are 5/5 bilaterally Coordination:  Finger to nose accurate, romberg negative Motor: strength 5/5 to upper and lower extremities bilaterally Sensory: intact to LT Reflexes - 2+ to upper and lower extremities bilaterally Gait: normal and steady, heel, toe and tandem gait intact Assessment: Brock Jeronimo is a 52 y.o. female who presents for follow up of headaches, paresthesias on the left side of the head and face, and abnormal smells. Her CTA was normal except for minor abnormalities in her maxillary sinuses. Her 24 hour EEG was normal.  She has seen significant improvement in her headaches and paresthesias on Lamictal  mg. She continues to notice the abnormal smell, but this could be coming from the [facility] pocket\" in her throat if food is getting trapped in it. Plan: 1. Advised patient to continue Lamictal  mg daily. 2.  Advised to follow up with her current ENT physician and inform him of what she was told by her previous ENT regarding her having a small pocket in her throat that could trap food and ask if this could be causing the smell she notices. 3. Dr. Golden Trammell also dicussed the potential benefit of vitamin B2 and magnesium with patient. Patient stated she already takes a daily MVI and she was advised to see if her MVI has these in it. 4.  Follow-up in 6 months or sooner if needed. Signed: 
Sherren Blackwater, NP 
9/7/2018 Discussed the above patient with Heydi Amaro NP and agree with her clinical assessment and plan of care as listed above. I have personally seen and examined this patient, reviewed all pertinent data, and agree with plan. Nicolas Scott MD 
9/9/2018 5:18 PM

## 2018-09-07 NOTE — PATIENT INSTRUCTIONS
[facility]   Statement to Patients  April 1, 2014      In an effort to ensure the large volume of patient prescription refills is processed in the most efficient and expeditious manner, we are asking our patients to assist us by calling your Pharmacy for all prescription refills, this will include also your  Mail Order Pharmacy. The pharmacy will contact our office electronically to continue the refill process. Please do not wait until the last minute to call your pharmacy. We need at least 48 hours (2days) to fill prescriptions. We also encourage you to call your pharmacy before going to  your prescription to make sure it is ready. With regard to controlled substance prescription refill requests (narcotic refills) that need to be picked up at our office, we ask your cooperation by providing us with at least 72 hours (3days) notice that you will need a refill. We will not refill narcotic prescription refill requests after 4:00pm on any weekday, Monday through Thursday, or after 2:00pm on Fridays, or on the weekends. We encourage everyone to explore another way of getting your prescription refill request processed using [employer], our patient web portal through our electronic medical record system. [employer] is an efficient and effective way to communicate your medication request directly to the office and  downloadable as an katerin on your smart phone . [employer] also features a review functionality that allows you to view your medication list as well as leave messages for your physician. Are you ready to get connected? If so please review the attatched instructions or speak to any of our staff to get you set up right away! Thank you so much for your cooperation. Should you have any questions please contact our Practice Administrator.     The Physicians and Staff,  [facility]   Patient Instruction Plan/ Result Policy    If we have ordered testing for you, know that; \"NO NEWS IS GOOD NEWS! \" It is our policy that we know longer call patients with results, nor do we  give test results over the phone. We schedule follow up appointments so that your results can be discussed in person. This allows you to address any questions you have regarding the results. If something of concern is revealed on your test, we will contact you to discuss the matter and if needed schedule a sooner follow up appointment. Additionally, results may be found by using the My Chart feature and one of our patient service representatives at the  can give you instructions on how to access this feature to utilize our electronic medical record system. Thank you for your understanding.

## 2018-09-07 NOTE — MR AVS SNAPSHOT
[address] [facility] [address] [place] [address] [address] [facility] [id number] [phone] Patient: Kylie Waller MRN: [id number] VSQ:[id number] Visit Information Date & Time Provider Department Dept. Phone Encounter #  
 9/7/2018  9:40 AM Jayashree Sahu  [facility] Neurology [facility] [phone] [id number] Follow-up Instructions Return in about 6 months (around 3/7/2019). Upcoming Health Maintenance Date Due DTaP/Tdap/Td series (1 - Tdap) 11/13/1991 PAP AKA CERVICAL CYTOLOGY 11/13/1991 Influenza Age 5 to Adult 8/1/2018 Allergies as of 9/7/2018  Review Complete On: 9/7/2018 By: General Rosemary NP No Known Allergies Current Immunizations  Never Reviewed No immunizations on file. Not reviewed this visit You Were Diagnosed With   
  
 Codes Comments Chronic tension-type headache, intractable     ICD-10-CM: P55.190 ICD-9-CM: 339.12 Paresthesia     ICD-10-CM: R20.2 ICD-9-CM: 782.0 Bilateral occipital neuralgia     ICD-10-CM: M54.81 ICD-9-CM: 723.8 Abnormal smell     ICD-10-CM: R43.1 ICD-9-CM: 781.1 High risk medications (not anticoagulants) long-term use     ICD-10-CM: Z79.899 ICD-9-CM: V58.69 Vitals BP Pulse Resp Height(growth percentile) Weight(growth percentile) SpO2  
 128/76 67 18 5' 6\" (1.676 m) 195 lb (88.5 kg) 99% BMI OB Status 31.47 kg/m2 Having regular periods BMI and BSA Data Body Mass Index Body Surface Area  
 31.47 kg/m 2 2.03 m 2 Preferred Pharmacy Pharmacy Name Phone [facility] [id number] [place] [address] [phone] Your Updated Medication List  
  
   
This list is accurate as of 9/7/18 10:50 AM.  Always use your most recent med list.  
  
  
  
  
 escitalopram oxalate 10 mg tablet Commonly known as:  Allie Levo hydroCHLOROthiazide 12.5 mg tablet Commonly known as:  HYDRODIURIL Take 12.5 mg by mouth daily. * lamoTRIgine 25mg (14)-50 mg (14)-100mg (7) Terd Commonly known as: LaMICtal XR Starter (Orange) Take as directed * lamoTRIgine 50 mg Tr24 ER tablet Commonly known as: LaMICtal XR Take 3 Tabs by mouth daily. levonorgestrel-ethinyl estradiol 50-30 (6)/75-40 (5)/125-30(10) Tab Commonly known as:  ENPRESSE Take  by mouth.  
  
 lisinopril 40 mg tablet Commonly known as:  Arnold Files Take 40 mg by mouth daily. PriLOSEC 40 mg capsule Generic drug:  omeprazole Take 40 mg by mouth daily. * Notice: This list has 2 medication(s) that are the same as other medications prescribed for you. Read the directions carefully, and ask your doctor or other care provider to review them with you. Prescriptions Printed Refills  
 lamoTRIgine (LAMICTAL XR) 50 mg tr24 ER tablet 3 Sig: Take 3 Tabs by mouth daily. Class: Print Route: Oral  
  
Follow-up Instructions Return in about 6 months (around 3/7/2019). Patient Instructions PRESCRIPTION REFILL POLICY [facility] Statement to Patients April 1, 2014 In an effort to ensure the large volume of patient prescription refills is processed in the most efficient and expeditious manner, we are asking our patients to assist us by calling your Pharmacy for all prescription refills, this will include also your  Mail Order Pharmacy. The pharmacy will contact our office electronically to continue the refill process. Please do not wait until the last minute to call your pharmacy. We need at least 48 hours (2days) to fill prescriptions. We also encourage you to call your pharmacy before going to  your prescription to make sure it is ready.   
 
With regard to controlled substance prescription refill requests (narcotic refills) that need to be picked up at our office, we ask your cooperation by providing us with at least 72 hours (3days) notice that you will need a refill. We will not refill narcotic prescription refill requests after 4:00pm on any weekday, Monday through Thursday, or after 2:00pm on Fridays, or on the weekends. We encourage everyone to explore another way of getting your prescription refill request processed using [employer], our patient web portal through our electronic medical record system. [employer] is an efficient and effective way to communicate your medication request directly to the office and  downloadable as an katerin on your smart phone . [employer] also features a review functionality that allows you to view your medication list as well as leave messages for your physician. Are you ready to get connected? If so please review the attatched instructions or speak to any of our staff to get you set up right away! Thank you so much for your cooperation. Should you have any questions please contact our Practice Administrator. The Physicians and Staff,  [facility]rology [facility] Patient Instruction Plan/ Result Policy If we have ordered testing for you, know that; \"NO NEWS IS GOOD NEWS! \" It is our policy that we know longer call patients with results, nor do we  give test results over the phone. We schedule follow up appointments so that your results can be discussed in person. This allows you to address any questions you have regarding the results. If something of concern is revealed on your test, we will contact you to discuss the matter and if needed schedule a sooner follow up appointment. Additionally, results may be found by using the My Chart feature and one of our patient service representatives at the  can give you instructions on how to access this feature to utilize our electronic medical record system. Thank you for your understanding. Introducing \[facility] [place] [facility]\"" & HEALTH SERVICES! Dear Vel Singleton: Thank you for requesting a [employer] account. Our records indicate that you already have an active [employer] account. You can access your account anytime at [email or web address] Did you know that you can access your hospital and ER discharge instructions at any time in [employer]? You can also review all of your test results from your hospital stay or ER visit. Additional Information If you have questions, please visit the Frequently Asked Questions section of the [employer] website at [email or web address] Remember, [employer] is NOT to be used for urgent needs. For medical emergencies, dial 911. Now available from your iPhone and Android! Please provide this summary of care documentation to your next provider. Your primary care clinician is listed as Nelia Otero. If you have any questions after today's visit, please call [phone].

## 2018-09-10 NOTE — PROCEDURES
Patient Name: Blanca Anderson  : 1970  Age: 52 y.o. Ordering physician: Mary Hayes  Date of EE18  EEG procedure number: [id number]  Diagnosis: Abnormal smells  Interpreting physician: Eliel Lopez MD      ELECTROENCEPHALOGRAM REPORT     PROCEDURE: 24 HOUR AMBULATORY EEG    START TIME: 18 at 1403  END TIME: 18 at 0833    CLINICAL INDICATION: The patient is a 52 y.o. female with a history of   possible seizures. EEG to rule out seizures, rule out stroke, rule out   cortical abnormality. EEG CLASSIFICATION: Essentially normal awake and asleep. DESCRIPTION OF THE RECORD:   The background of this recording contains a posteriorly-located occipital alpha rhythm of 11 Hz that attenuates with eye opening. Throughout the recording, there were no clear areas of focal slowing nor spike or spike-and-wave discharges seen. Hyperventilation was not performed. Photic stimulation was not performed. During the recording, the patient did enter prolonged states of sleep with K-complexes and sleep spindles seen in the central head regions. DIARY EVENTS: Yes  Patient had abnormal smells throughout the study. No EEG change. INTERPRETATION: This is a normal electroencephalogram with the patient   awake and asleep, showing no clear focal abnormalities or epileptiform   activity. A normal EEG doesn't not rule out seizures. Clinical correlation recommended.     Mary Hayes MD  2018  8:20 PM

## 2018-09-25 ENCOUNTER — TELEPHONE (OUTPATIENT)
Dept: NEUROLOGY | Age: 48
End: 2018-09-25

## 2024-12-24 ENCOUNTER — HOSPITAL ENCOUNTER (OUTPATIENT)
Facility: HOSPITAL | Age: 54
Discharge: HOME | End: 2024-12-27
Payer: COMMERCIAL

## 2024-12-24 DIAGNOSIS — R51.9 FREQUENT HEADACHES: ICD-10-CM

## 2024-12-24 PROCEDURE — 70553 MRI BRAIN STEM W/O & W/DYE: CPT

## 2024-12-24 PROCEDURE — A9579 GAD-BASE MR CONTRAST NOS,1ML: HCPCS | Performed by: STUDENT IN AN ORGANIZED HEALTH CARE EDUCATION/TRAINING PROGRAM

## 2024-12-24 PROCEDURE — 6360000004 HC RX CONTRAST MEDICATION: Performed by: STUDENT IN AN ORGANIZED HEALTH CARE EDUCATION/TRAINING PROGRAM

## 2024-12-24 RX ADMIN — GADOTERIDOL 20 ML: 279.3 INJECTION, SOLUTION INTRAVENOUS at 14:24
